# Patient Record
Sex: FEMALE | Race: WHITE | NOT HISPANIC OR LATINO | Employment: UNEMPLOYED | RURAL
[De-identification: names, ages, dates, MRNs, and addresses within clinical notes are randomized per-mention and may not be internally consistent; named-entity substitution may affect disease eponyms.]

---

## 2022-07-20 ENCOUNTER — OFFICE VISIT (OUTPATIENT)
Dept: FAMILY MEDICINE | Facility: CLINIC | Age: 27
End: 2022-07-20

## 2022-07-20 ENCOUNTER — TELEPHONE (OUTPATIENT)
Dept: FAMILY MEDICINE | Facility: CLINIC | Age: 27
End: 2022-07-20

## 2022-07-20 VITALS
SYSTOLIC BLOOD PRESSURE: 142 MMHG | BODY MASS INDEX: 38.06 KG/M2 | WEIGHT: 236.81 LBS | TEMPERATURE: 98 F | HEIGHT: 66 IN | HEART RATE: 99 BPM | OXYGEN SATURATION: 99 % | DIASTOLIC BLOOD PRESSURE: 91 MMHG

## 2022-07-20 DIAGNOSIS — Z34.90 PREGNANCY, UNSPECIFIED GESTATIONAL AGE: Primary | ICD-10-CM

## 2022-07-20 DIAGNOSIS — R42 DIZZINESS: ICD-10-CM

## 2022-07-20 DIAGNOSIS — N92.6 MISSED PERIOD: ICD-10-CM

## 2022-07-20 LAB
B-HCG UR QL: POSITIVE
BILIRUB SERPL-MCNC: NEGATIVE MG/DL
BLOOD URINE, POC: NEGATIVE
COLOR, POC UA: YELLOW
CTP QC/QA: YES
GLUCOSE UR QL STRIP: NEGATIVE
HCG SERPL-ACNC: NORMAL MIU/ML
KETONES UR QL STRIP: NEGATIVE
LEUKOCYTE ESTERASE URINE, POC: NEGATIVE
NITRITE, POC UA: NEGATIVE
PH, POC UA: 6
PROTEIN, POC: NEGATIVE
SPECIFIC GRAVITY, POC UA: <=1.005
UROBILINOGEN, POC UA: 0.2

## 2022-07-20 PROCEDURE — 81025 POCT URINE PREGNANCY: ICD-10-PCS | Mod: QW,,, | Performed by: REGISTERED NURSE

## 2022-07-20 PROCEDURE — 81003 POCT URINALYSIS W/O SCOPE: ICD-10-PCS | Mod: QW,,, | Performed by: REGISTERED NURSE

## 2022-07-20 PROCEDURE — 84702 HCG, TOTAL, QUANTITATIVE: ICD-10-PCS | Mod: ,,, | Performed by: CLINICAL MEDICAL LABORATORY

## 2022-07-20 PROCEDURE — 84702 CHORIONIC GONADOTROPIN TEST: CPT | Mod: ,,, | Performed by: CLINICAL MEDICAL LABORATORY

## 2022-07-20 PROCEDURE — 81025 URINE PREGNANCY TEST: CPT | Mod: QW,,, | Performed by: REGISTERED NURSE

## 2022-07-20 PROCEDURE — 99204 PR OFFICE/OUTPT VISIT, NEW, LEVL IV, 45-59 MIN: ICD-10-PCS | Mod: ,,, | Performed by: REGISTERED NURSE

## 2022-07-20 PROCEDURE — 99204 OFFICE O/P NEW MOD 45 MIN: CPT | Mod: ,,, | Performed by: REGISTERED NURSE

## 2022-07-20 PROCEDURE — 81003 URINALYSIS AUTO W/O SCOPE: CPT | Mod: QW,,, | Performed by: REGISTERED NURSE

## 2022-07-20 NOTE — PATIENT INSTRUCTIONS
Quantitative pregnancy test ordered to better determine gestational age. Encouraged to go to Medicaid office and apply for healthcare assistance. Make an appointment with preferred OBGYN ASAP. Phone numbers for [facility] and [facility] clinic provided.    Encouraged prenatal vitamins, one tablet or capsule per day until care is established with OBGYN.     Monitor blood pressure closely, drink at least 6-8 glasses of water per day. Limit salt/sodium intake to prevent elevated blood pressure and fluid retention.

## 2022-07-20 NOTE — TELEPHONE ENCOUNTER
----- Message from JAC Gerber sent at 7/20/2022 10:57 AM CDT -----  Please let Shira know the urine test does not show any bacteria or blood. It is clear from infection or sugar. No changes need to be made at this time.

## 2022-07-20 NOTE — PROGRESS NOTES
JAC Gerber        PATIENT NAME: Shira Carranza  : 1995  DATE: 22  MRN: [id number]      Billing Provider: JAC Gerber  Level of Service: [place] OFFICE/OUTPT VISIT, SHI JACOBSEN IV, 45-59 MIN  Patient PCP Information     Provider PCP Type    JAC Gerber General          Reason for Visit / Chief Complaint: confirmation pregnancy (Took several pregnancy tests and they were positive; has history of irregular periods so really wouldn't know how far along she is; needs confirmation pregnancy to apply for insurance)       Update PCP  Update Chief Complaint         History of Present Illness / Problem Focused Workflow     HPI Miss Carranza is a 28 y/o WF here for pregnancy confirmation. Last menstrual cycle 2022. Estimated gestation 13-14 weeks. Due date around 2023. Dates may be inaccurate as patient states since 2022 her cycles have become slightly irregular. She reports she had been having a 5-7 day cycle every 6-8 weeks instead of every 28-32 days. She denies nausea or vomiting, reports having some dizziness and hot flashes for the past week.     Review of Systems     Review of Systems   Constitutional: Positive for diaphoresis.   HENT: Negative.    Respiratory: Negative.    Cardiovascular: Negative.    Gastrointestinal: Negative.    Genitourinary: Negative.    Musculoskeletal: Negative.    Neurological: Positive for dizziness, light-headedness and headaches.   Psychiatric/Behavioral: Negative.         Medical / Social / Family History   History reviewed. No pertinent past medical history.    History reviewed. No pertinent surgical history.    Social History  Ms. Shira Carranza  reports that she has never smoked. She has never used smokeless tobacco. She reports previous alcohol use. She reports that she does not use drugs.    Family History  Ms. Shira Carranza's family history is not on file.    Medications and Allergies     Medications  No outpatient  medications have been marked as taking for the 7/20/22 encounter (Office Visit) with JAC Gerber.       Allergies  Review of patient's allergies indicates:  No Known Allergies    Physical Examination     Vitals:    07/20/22 1017   BP: (!) 142/91   Pulse:    Temp:      Physical Exam  Vitals and nursing note reviewed.   Constitutional:       Appearance: Normal appearance. She is obese.   HENT:      Head: Normocephalic and atraumatic.   Cardiovascular:      Rate and Rhythm: Normal rate and regular rhythm.      Heart sounds: Normal heart sounds.   Pulmonary:      Effort: Pulmonary effort is normal.      Breath sounds: Normal breath sounds.   Abdominal:      General: Bowel sounds are normal.   Musculoskeletal:         General: Normal range of motion.      Cervical back: Normal range of motion.   Skin:     General: Skin is warm.   Neurological:      Mental Status: She is alert and oriented to person, place, and time.   Psychiatric:         Behavior: Behavior normal.       Assessment and Plan (including Health Maintenance)      Problem List  Smart Sets  Document Outside HM   Plan:   Pregnancy, unspecified gestational age  -     hCG, Total, Quantitative; Future; Expected date: 07/20/2022    Missed period  -     POCT urine pregnancy    Dizziness  -     POCT URINALYSIS W/O SCOPE       Health Maintenance Due   Topic Date Due    Hepatitis C Screening  Never done    Lipid Panel  Never done    COVID-19 Vaccine (1) Never done    HIV Screening  Never done    TETANUS VACCINE  Never done    Pap Smear  Never done       Problem List Items Addressed This Visit    None     Visit Diagnoses     Pregnancy, unspecified gestational age    -  Primary    Relevant Orders    hCG, Total, Quantitative    Missed period        Relevant Orders    POCT urine pregnancy (Completed)    Dizziness        Relevant Orders    POCT URINALYSIS W/O SCOPE (Completed)          Health Maintenance Topics with due status: Not Due       Topic Last  Completion Date    Influenza Vaccine Not Due       No future appointments.     Patient Instructions   Quantitative pregnancy test ordered to better determine gestational age. Encouraged to go to Medicaid office and apply for healthcare assistance. Make an appointment with preferred OBGYN ASAP. Phone numbers for [facility] and [facility] clinic provided.    Encouraged prenatal vitamins, one tablet or capsule per day until care is established with OBGYN.     Monitor blood pressure closely, drink at least 6-8 glasses of water per day. Limit salt/sodium intake to prevent elevated blood pressure and fluid retention.     Follow up if symptoms worsen or fail to improve.     Signature:  JAC Gerber      Date of encounter: 7/20/22